# Patient Record
Sex: FEMALE | Race: BLACK OR AFRICAN AMERICAN
[De-identification: names, ages, dates, MRNs, and addresses within clinical notes are randomized per-mention and may not be internally consistent; named-entity substitution may affect disease eponyms.]

---

## 2018-10-25 PROBLEM — Z00.00 ENCOUNTER FOR PREVENTIVE HEALTH EXAMINATION: Status: ACTIVE | Noted: 2018-10-25

## 2018-10-31 ENCOUNTER — APPOINTMENT (OUTPATIENT)
Dept: OBGYN | Facility: CLINIC | Age: 68
End: 2018-10-31

## 2019-01-31 ENCOUNTER — APPOINTMENT (OUTPATIENT)
Dept: OBGYN | Facility: CLINIC | Age: 69
End: 2019-01-31

## 2023-08-28 ENCOUNTER — LABORATORY RESULT (OUTPATIENT)
Age: 73
End: 2023-08-28

## 2023-08-29 ENCOUNTER — NON-APPOINTMENT (OUTPATIENT)
Age: 73
End: 2023-08-29

## 2023-08-29 ENCOUNTER — APPOINTMENT (OUTPATIENT)
Dept: OBGYN | Facility: CLINIC | Age: 73
End: 2023-08-29
Payer: MEDICARE

## 2023-08-29 ENCOUNTER — LABORATORY RESULT (OUTPATIENT)
Age: 73
End: 2023-08-29

## 2023-08-29 DIAGNOSIS — N89.8 OTHER SPECIFIED NONINFLAMMATORY DISORDERS OF VAGINA: ICD-10-CM

## 2023-08-29 DIAGNOSIS — R39.9 UNSPECIFIED SYMPTOMS AND SIGNS INVOLVING THE GENITOURINARY SYSTEM: ICD-10-CM

## 2023-08-29 DIAGNOSIS — Z01.419 ENCOUNTER FOR GYNECOLOGICAL EXAMINATION (GENERAL) (ROUTINE) W/OUT ABNORMAL FINDINGS: ICD-10-CM

## 2023-08-29 PROCEDURE — 99203 OFFICE O/P NEW LOW 30 MIN: CPT | Mod: 25

## 2023-08-29 PROCEDURE — 99387 INIT PM E/M NEW PAT 65+ YRS: CPT | Mod: GY

## 2023-08-29 NOTE — HISTORY OF PRESENT ILLNESS
[FreeTextEntry1] : ---- 72 Y/O  here for check up;pt also c/o vulva itching and vaginal discomfort. PMHX;/RIGHT BREAST CANCER;NO CHEMO OR RT PSHX;/BREAST BX  BTL  FOOT EAR SOCIAL HX;-ETOH -CIGG  STD;   /        STD PREVENTION DISCUSSED FAMILY HISTORY OF BREAST CANCER;SISTER REVIEW OF SYMPTOMS DONE; ALLERGIES; pt answered PCN Medication reconciliation was completed by reviewing, with the patient's involvement, the patient's current outpatient medications and those  ordered for the patient today.           PE;BREASTS;- MASSES DC NODES SBE ABDOMEN;SOFT NT ND NL GENITALIA WHITE SCALY LESIONS B/L VAGINA -DC WHITE CERVIX;-CMT UTERUS;NL SIZE NT AV ADNEXA; NT - MASSES   A;P;CHECK UP, VULVITIS,VAGINAL DC -PAP GC CHLAMYDIA -BD AFFIRM -RTC FOR VULV BX -SONO -F-U AFTER ABOVE.

## 2023-08-31 LAB
APPEARANCE: CLEAR
BACTERIA UR CULT: NORMAL
BILIRUBIN URINE: NEGATIVE
BLOOD URINE: NEGATIVE
COLOR: YELLOW
GLUCOSE QUALITATIVE U: 250 MG/DL
KETONES URINE: NEGATIVE MG/DL
LEUKOCYTE ESTERASE URINE: ABNORMAL
NITRITE URINE: NEGATIVE
PH URINE: 5.5
PROTEIN URINE: NORMAL MG/DL
SPECIFIC GRAVITY URINE: 1.02
UROBILINOGEN URINE: 0.2 MG/DL

## 2023-09-01 LAB — HPV HIGH+LOW RISK DNA PNL CVX: DETECTED

## 2023-09-03 LAB
A VAGINAE DNA VAG QL NAA+PROBE: ABNORMAL
BVAB2 DNA VAG QL NAA+PROBE: NORMAL
C KRUSEI DNA VAG QL NAA+PROBE: NEGATIVE
C TRACH RRNA SPEC QL NAA+PROBE: NEGATIVE
CANDIDA DNA VAG QL NAA+PROBE: NEGATIVE
CYTOLOGY CVX/VAG DOC THIN PREP: NORMAL
MEGA1 DNA VAG QL NAA+PROBE: NORMAL
N GONORRHOEA RRNA SPEC QL NAA+PROBE: NEGATIVE
T VAGINALIS RRNA SPEC QL NAA+PROBE: NEGATIVE

## 2023-09-07 ENCOUNTER — APPOINTMENT (OUTPATIENT)
Dept: OBGYN | Facility: CLINIC | Age: 73
End: 2023-09-07
Payer: MEDICARE

## 2023-09-07 ENCOUNTER — NON-APPOINTMENT (OUTPATIENT)
Age: 73
End: 2023-09-07

## 2023-09-07 PROCEDURE — 76830 TRANSVAGINAL US NON-OB: CPT

## 2023-09-07 RX ORDER — METRONIDAZOLE 500 MG/1
500 TABLET ORAL TWICE DAILY
Qty: 14 | Refills: 0 | Status: ACTIVE | COMMUNITY
Start: 2023-09-07 | End: 1900-01-01

## 2023-09-12 ENCOUNTER — APPOINTMENT (OUTPATIENT)
Dept: OBGYN | Facility: CLINIC | Age: 73
End: 2023-09-12
Payer: MEDICARE

## 2023-09-12 DIAGNOSIS — B97.7 PAPILLOMAVIRUS AS THE CAUSE OF DISEASES CLASSIFIED ELSEWHERE: ICD-10-CM

## 2023-09-12 PROCEDURE — 99213 OFFICE O/P EST LOW 20 MIN: CPT | Mod: 25

## 2023-09-12 PROCEDURE — 57454 BX/CURETT OF CERVIX W/SCOPE: CPT

## 2023-09-12 PROCEDURE — 56605 BIOPSY OF VULVA/PERINEUM: CPT

## 2023-09-18 LAB — CORE LAB BIOPSY: NORMAL

## 2023-10-10 ENCOUNTER — APPOINTMENT (OUTPATIENT)
Dept: OBGYN | Facility: CLINIC | Age: 73
End: 2023-10-10
Payer: MEDICARE

## 2023-10-10 PROCEDURE — 99213 OFFICE O/P EST LOW 20 MIN: CPT

## 2023-10-10 RX ORDER — TERCONAZOLE 8 MG/G
0.8 CREAM VAGINAL
Qty: 20 | Refills: 1 | Status: ACTIVE | COMMUNITY
Start: 2023-10-10 | End: 1900-01-01

## 2024-04-02 ENCOUNTER — OUTPATIENT (OUTPATIENT)
Dept: OUTPATIENT SERVICES | Facility: HOSPITAL | Age: 74
LOS: 1 days | End: 2024-04-02

## 2024-04-02 ENCOUNTER — APPOINTMENT (OUTPATIENT)
Dept: SPEECH THERAPY | Facility: CLINIC | Age: 74
End: 2024-04-02

## 2024-04-02 DIAGNOSIS — R42 DIZZINESS AND GIDDINESS: ICD-10-CM

## 2024-04-02 PROCEDURE — 92540 BASIC VESTIBULAR EVALUATION: CPT

## 2024-04-02 PROCEDURE — 92537 CALORIC VSTBLR TEST W/REC: CPT

## 2024-09-25 ENCOUNTER — INPATIENT (INPATIENT)
Facility: HOSPITAL | Age: 74
LOS: 1 days | Discharge: HOME HEALTH | DRG: 312 | End: 2024-09-27
Attending: INTERNAL MEDICINE | Admitting: STUDENT IN AN ORGANIZED HEALTH CARE EDUCATION/TRAINING PROGRAM
Payer: MEDICARE

## 2024-09-25 VITALS
OXYGEN SATURATION: 97 % | RESPIRATION RATE: 20 BRPM | HEART RATE: 82 BPM | DIASTOLIC BLOOD PRESSURE: 73 MMHG | WEIGHT: 154.1 LBS | SYSTOLIC BLOOD PRESSURE: 120 MMHG | TEMPERATURE: 98 F

## 2024-09-25 DIAGNOSIS — R55 SYNCOPE AND COLLAPSE: ICD-10-CM

## 2024-09-25 LAB
ALBUMIN SERPL ELPH-MCNC: 4.4 G/DL — SIGNIFICANT CHANGE UP (ref 3.5–5.2)
ALP SERPL-CCNC: 74 U/L — SIGNIFICANT CHANGE UP (ref 30–115)
ALT FLD-CCNC: 32 U/L — SIGNIFICANT CHANGE UP (ref 0–41)
ANION GAP SERPL CALC-SCNC: 15 MMOL/L — HIGH (ref 7–14)
ANISOCYTOSIS BLD QL: SLIGHT — SIGNIFICANT CHANGE UP
AST SERPL-CCNC: 67 U/L — HIGH (ref 0–41)
BASE EXCESS BLDV CALC-SCNC: 3 MMOL/L — SIGNIFICANT CHANGE UP (ref -2–3)
BASOPHILS # BLD AUTO: 0.22 K/UL — HIGH (ref 0–0.2)
BASOPHILS NFR BLD AUTO: 2.6 % — HIGH (ref 0–1)
BILIRUB SERPL-MCNC: 0.3 MG/DL — SIGNIFICANT CHANGE UP (ref 0.2–1.2)
BUN SERPL-MCNC: 20 MG/DL — SIGNIFICANT CHANGE UP (ref 10–20)
CA-I SERPL-SCNC: 1.21 MMOL/L — SIGNIFICANT CHANGE UP (ref 1.15–1.33)
CALCIUM SERPL-MCNC: 9.8 MG/DL — SIGNIFICANT CHANGE UP (ref 8.4–10.4)
CHLORIDE SERPL-SCNC: 102 MMOL/L — SIGNIFICANT CHANGE UP (ref 98–110)
CO2 SERPL-SCNC: 23 MMOL/L — SIGNIFICANT CHANGE UP (ref 17–32)
CREAT SERPL-MCNC: 1 MG/DL — SIGNIFICANT CHANGE UP (ref 0.7–1.5)
EGFR: 59 ML/MIN/1.73M2 — LOW
EOSINOPHIL # BLD AUTO: 0 K/UL — SIGNIFICANT CHANGE UP (ref 0–0.7)
EOSINOPHIL NFR BLD AUTO: 0 % — SIGNIFICANT CHANGE UP (ref 0–8)
GAS PNL BLDV: 132 MMOL/L — LOW (ref 136–145)
GAS PNL BLDV: SIGNIFICANT CHANGE UP
GAS PNL BLDV: SIGNIFICANT CHANGE UP
GIANT PLATELETS BLD QL SMEAR: PRESENT — SIGNIFICANT CHANGE UP
GLUCOSE BLDC GLUCOMTR-MCNC: 140 MG/DL — HIGH (ref 70–99)
GLUCOSE SERPL-MCNC: 155 MG/DL — HIGH (ref 70–99)
HCO3 BLDV-SCNC: 29 MMOL/L — SIGNIFICANT CHANGE UP (ref 22–29)
HCT VFR BLD CALC: 39.5 % — SIGNIFICANT CHANGE UP (ref 37–47)
HCT VFR BLDA CALC: 51 % — HIGH (ref 34.5–46.5)
HGB BLD CALC-MCNC: 16.9 G/DL — HIGH (ref 11.7–16.1)
HGB BLD-MCNC: 12.9 G/DL — SIGNIFICANT CHANGE UP (ref 12–16)
LACTATE BLDV-MCNC: 2.7 MMOL/L — HIGH (ref 0.5–2)
LYMPHOCYTES # BLD AUTO: 2.02 K/UL — SIGNIFICANT CHANGE UP (ref 1.2–3.4)
LYMPHOCYTES # BLD AUTO: 24.3 % — SIGNIFICANT CHANGE UP (ref 20.5–51.1)
MANUAL SMEAR VERIFICATION: SIGNIFICANT CHANGE UP
MCHC RBC-ENTMCNC: 29.1 PG — SIGNIFICANT CHANGE UP (ref 27–31)
MCHC RBC-ENTMCNC: 32.7 G/DL — SIGNIFICANT CHANGE UP (ref 32–37)
MCV RBC AUTO: 89 FL — SIGNIFICANT CHANGE UP (ref 81–99)
MICROCYTES BLD QL: SLIGHT — SIGNIFICANT CHANGE UP
MONOCYTES # BLD AUTO: 0.72 K/UL — HIGH (ref 0.1–0.6)
MONOCYTES NFR BLD AUTO: 8.7 % — SIGNIFICANT CHANGE UP (ref 1.7–9.3)
NEUTROPHILS # BLD AUTO: 5.28 K/UL — SIGNIFICANT CHANGE UP (ref 1.4–6.5)
NEUTROPHILS NFR BLD AUTO: 63.5 % — SIGNIFICANT CHANGE UP (ref 42.2–75.2)
NT-PROBNP SERPL-SCNC: 56 PG/ML — SIGNIFICANT CHANGE UP (ref 0–300)
PCO2 BLDV: 48 MMHG — HIGH (ref 39–42)
PH BLDV: 7.39 — SIGNIFICANT CHANGE UP (ref 7.32–7.43)
PLAT MORPH BLD: ABNORMAL
PLATELET # BLD AUTO: 174 K/UL — SIGNIFICANT CHANGE UP (ref 130–400)
PMV BLD: 12.7 FL — HIGH (ref 7.4–10.4)
PO2 BLDV: 34 MMHG — SIGNIFICANT CHANGE UP (ref 25–45)
POLYCHROMASIA BLD QL SMEAR: SIGNIFICANT CHANGE UP
POTASSIUM BLDV-SCNC: 4.8 MMOL/L — SIGNIFICANT CHANGE UP (ref 3.5–5.1)
POTASSIUM SERPL-MCNC: 5.4 MMOL/L — HIGH (ref 3.5–5)
POTASSIUM SERPL-SCNC: 5.4 MMOL/L — HIGH (ref 3.5–5)
PROT SERPL-MCNC: 7.4 G/DL — SIGNIFICANT CHANGE UP (ref 6–8)
RBC # BLD: 4.44 M/UL — SIGNIFICANT CHANGE UP (ref 4.2–5.4)
RBC # FLD: 15.3 % — HIGH (ref 11.5–14.5)
RBC BLD AUTO: ABNORMAL
SAO2 % BLDV: 56.9 % — LOW (ref 67–88)
SODIUM SERPL-SCNC: 140 MMOL/L — SIGNIFICANT CHANGE UP (ref 135–146)
TROPONIN T, HIGH SENSITIVITY RESULT: 9 NG/L — SIGNIFICANT CHANGE UP (ref 6–13)
VARIANT LYMPHS # BLD: 0.9 % — SIGNIFICANT CHANGE UP (ref 0–5)
WBC # BLD: 8.31 K/UL — SIGNIFICANT CHANGE UP (ref 4.8–10.8)
WBC # FLD AUTO: 8.31 K/UL — SIGNIFICANT CHANGE UP (ref 4.8–10.8)

## 2024-09-25 PROCEDURE — 99222 1ST HOSP IP/OBS MODERATE 55: CPT | Mod: GC

## 2024-09-25 PROCEDURE — 71275 CT ANGIOGRAPHY CHEST: CPT | Mod: 26,MC

## 2024-09-25 PROCEDURE — 82652 VIT D 1 25-DIHYDROXY: CPT

## 2024-09-25 PROCEDURE — 71045 X-RAY EXAM CHEST 1 VIEW: CPT | Mod: 26

## 2024-09-25 PROCEDURE — 99285 EMERGENCY DEPT VISIT HI MDM: CPT

## 2024-09-25 PROCEDURE — 36415 COLL VENOUS BLD VENIPUNCTURE: CPT

## 2024-09-25 PROCEDURE — 82962 GLUCOSE BLOOD TEST: CPT

## 2024-09-25 PROCEDURE — 80053 COMPREHEN METABOLIC PANEL: CPT

## 2024-09-25 RX ORDER — METOPROLOL TARTRATE 50 MG
50 TABLET ORAL DAILY
Refills: 0 | Status: DISCONTINUED | OUTPATIENT
Start: 2024-09-25 | End: 2024-09-27

## 2024-09-25 RX ORDER — GLIPIZIDE 5 MG/1
1 TABLET ORAL
Refills: 0 | DISCHARGE

## 2024-09-25 RX ORDER — ROSUVASTATIN CALCIUM 20 MG/1
1 TABLET, COATED ORAL
Refills: 0 | DISCHARGE

## 2024-09-25 RX ORDER — PANTOPRAZOLE SODIUM 40 MG/1
40 TABLET, DELAYED RELEASE ORAL
Refills: 0 | Status: DISCONTINUED | OUTPATIENT
Start: 2024-09-25 | End: 2024-09-27

## 2024-09-25 RX ORDER — ENOXAPARIN SODIUM 150 MG/ML
40 INJECTION SUBCUTANEOUS EVERY 24 HOURS
Refills: 0 | Status: DISCONTINUED | OUTPATIENT
Start: 2024-09-25 | End: 2024-09-27

## 2024-09-25 RX ORDER — METOPROLOL TARTRATE 50 MG
1 TABLET ORAL
Refills: 0 | DISCHARGE

## 2024-09-25 RX ORDER — SEMAGLUTIDE 1.34 MG/ML
1 INJECTION, SOLUTION SUBCUTANEOUS
Refills: 0 | DISCHARGE

## 2024-09-25 RX ORDER — METFORMIN HCL 500 MG
1 TABLET ORAL
Refills: 0 | DISCHARGE

## 2024-09-25 RX ORDER — ASPIRIN 325 MG
0 TABLET ORAL
Refills: 0 | DISCHARGE

## 2024-09-25 RX ORDER — ROSUVASTATIN CALCIUM 20 MG/1
20 TABLET, COATED ORAL AT BEDTIME
Refills: 0 | Status: DISCONTINUED | OUTPATIENT
Start: 2024-09-25 | End: 2024-09-27

## 2024-09-25 RX ORDER — ASPIRIN 325 MG
81 TABLET ORAL DAILY
Refills: 0 | Status: DISCONTINUED | OUTPATIENT
Start: 2024-09-25 | End: 2024-09-27

## 2024-09-25 RX ORDER — LISINOPRIL/HYDROCHLOROTHIAZIDE 10-12.5 MG
1 TABLET ORAL
Refills: 0 | DISCHARGE

## 2024-09-25 RX ORDER — CRANBERRY FRUIT EXTRACT 650 MG
1000 CAPSULE ORAL DAILY
Refills: 0 | Status: DISCONTINUED | OUTPATIENT
Start: 2024-09-25 | End: 2024-09-27

## 2024-09-25 NOTE — H&P ADULT - HISTORY OF PRESENT ILLNESS
74 hx HTN, HLD, DM2, GERD, p/w SOB and was referred by her PMD to r/o PE.  She explains intermittent SOB episodes for last 6 weeks, episodes lasting few minutes for last 6 weeks, resolves on its own, happens both at rest and ambulation; No associated chest pain, palpitations, SOB; No orthopnea, PND.     She also endorses episodes where she episodes where she is "falling asleep"     ED Course  Vitals: HDS and Afebrile  Imaging: No PE  Labs: Unremarkable     Patient being admitted to medicine for further management.      74 hx HTN, HLD, DM2, GERD, p/w SOB and was referred by her PMD to r/o PE.  She explains intermittent SOB episodes for last 6 weeks, episodes lasting few minutes for last 6 weeks, resolves on its own, happens both at rest and ambulation; No associated chest pain, palpitations, SOB; No orthopnea, PND.     ED Course  Vitals: HDS and Afebrile  Imaging: No PE  Labs: Unremarkable     Patient being admitted to medicine for further management.

## 2024-09-25 NOTE — ED PROVIDER NOTE - ATTENDING APP SHARED VISIT CONTRIBUTION OF CARE
74 hx htn, hld, dm, gerd  pt here for eval of 6 weeks of intermittent sob. pt saw OP cards today who rec ed eval to r/o pe.  no cp, [place].  no pattern to sob episodes. sx last ~5 minutes and pass. no LE edema.  no palpitations or syncope. no orthopniea     vss  gen- NAD, aaox3  card-rrr  lungs-ctab, no wheezing or rhonchi  abd-sntnd, no guarding or rebound  neuro- full str/sensation, cn ii-xii grossly intact, normal coordination  LE- no calf pain/swelling/tenderness b/l 74 hx htn, hld, dm, gerd  pt here for eval of 6 weeks of intermittent sob. pt saw OP cards today who rec ed eval to r/o pe.  no cp, [place].  no pattern to sob episodes. sx last ~5 minutes and pass. no LE edema.  no palpitations . pt states with some of these episodes she "falls asleep." pt states she has sat down for the SOB event and "woke up." no incontinence, tongue bite, shaking, hx sz, shaking.       vss  gen- NAD, aaox3  card-rrr  lungs-ctab, no wheezing or rhonchi  abd-sntnd, no guarding or rebound  neuro- full str/sensation, cn ii-xii grossly intact, normal coordination  LE- no calf pain/swelling/tenderness b/l

## 2024-09-25 NOTE — ED PROVIDER NOTE - CLINICAL SUMMARY MEDICAL DECISION MAKING FREE TEXT BOX
Patient endorsed to me by Dr. Guzman pending CT angio and admission.   74 hx htn, hld, dm, gerd p/w SOB and episodes where she states she is "falling asleep" however unsure if these episodes are syncopal. denies chest pain. ekg independently interpreted by me Dr. Madrigal showing NSR, no stemi. labs stable. CT angio chest with no PE, "Diffuse bilateral mosaic ground-glass pattern, which may be secondary to air trapping." ambulatory pulse ox within normal limits. however given recent hx of ablation?? in the city last year, concern for possible arrhythmia. will admit for further work up.

## 2024-09-25 NOTE — ED PROVIDER NOTE - PHYSICAL EXAMINATION
Gen: NAD, AOx3  Head: NCAT  HEENT: PERRL, oral mucosa moist, normal conjunctiva, oropharynx clear without exudate or erythema  Lung: CTAB, no respiratory distress, no wheezing, rales, rhonchi  CV: normal s1/s2, rrr, Normal perfusion, pulses 2+ throughout  Abd: soft, NTND, no CVA tenderness  Genitourinary: no pelvic tenderness  MSK: No edema, no visible deformities, full range of motion in all 4 extremities  Neuro: CN II-XII grossly intact, No focal neurologic deficits, no meningeal signs, no nystagmus/pronator drift, coordination/sensation intact, strength 5/5 BUE/BLE, steady gait   Skin: No rash   Psych: normal affect WDL

## 2024-09-25 NOTE — H&P ADULT - NSHPLABSRESULTS_GEN_ALL_CORE
Home Medications:  aspirin 81 mg oral delayed release capsule: orally once a day (25 Sep 2024 21:40)  glipiZIDE 5 mg oral tablet: 1 tab(s) orally once a day (25 Sep 2024 21:40)  lisinopril-hydrochlorothiazide 20 mg-25 mg oral tablet: 1 tab(s) orally once a day (25 Sep 2024 21:40)  metFORMIN 1000 mg oral tablet: 1 tab(s) orally 2 times a day (25 Sep 2024 21:40)  metoprolol succinate 50 mg oral capsule, extended release: 1 cap(s) orally once a day (25 Sep 2024 21:40)  omeprazole 40 mg oral delayed release capsule: 1 cap(s) orally once a day (25 Sep 2024 21:40)  Ozempic 4 mg/3 mL (1 mg dose) subcutaneous solution: 1 milligram(s) subcutaneously once a week (25 Sep 2024 21:40)  rosuvastatin 20 mg oral tablet: 1 tab(s) orally once a day (at bedtime) (25 Sep 2024 21:40)    MEDICATIONS  (STANDING):    MEDICATIONS  (PRN):        LABS:                        12.9   8.31  )-----------( 174      ( 25 Sep 2024 13:07 )             39.5     09-25    140  |  102  |  20  ----------------------------<  155[H]  5.4[H]   |  23  |  1.0    Ca    9.8      25 Sep 2024 13:07    TPro  7.4  /  Alb  4.4  /  TBili  0.3  /  DBili  x   /  AST  67[H]  /  ALT  32  /  AlkPhos  74  09-25    LIVER FUNCTIONS - ( 25 Sep 2024 13:07 )  Alb: 4.4 g/dL / Pro: 7.4 g/dL / ALK PHOS: 74 U/L / ALT: 32 U/L / AST: 67 U/L / GGT: x                 Urinalysis Basic - ( 25 Sep 2024 13:07 )    Color: x / Appearance: x / SG: x / pH: x  Gluc: 155 mg/dL / Ketone: x  / Bili: x / Urobili: x   Blood: x / Protein: x / Nitrite: x   Leuk Esterase: x / RBC: x / WBC x   Sq Epi: x / Non Sq Epi: x / Bacteria: x

## 2024-09-25 NOTE — ED PROVIDER NOTE - OBJECTIVE STATEMENT
74-year-old female with past medical history of HTN, HLD, DM, GERD presents complaining of shortness of breath over the past 6 weeks.  Patient denies any aggravating/alleviating factors and notes to have SOB episodes that last about 5mins.  Patient mentions to have possibly syncopized. pt denies any other symptoms including fevers, chill, headache, recent illness/travel, cough, abdominal pain, chest pain, or SOB.

## 2024-09-25 NOTE — H&P ADULT - ASSESSMENT
azithromycin (ZITHROMAX Z-SMITA) 250 mg tablet Take 2 tablets the first day then  1 tablet each day till completion   dapagliflozin propanediol (FARXIGA) 10 mg tablet Take 1 tablet by mouth once  daily.   metoprolol succinate XL (TOPROL XL) 50 mg tablet extended release 24 hr TAKE 1  TABLET BY MOUTH EVERY DAY   diclofenac (VOLTAREN) 1 % topical gel Apply to effected area daily as directed   semaglutide (OZEMPIC) 1 mg/dose (4 mg/3 mL) syringe Inject 1 mg subcutaneously  once a week.   aspirin 81 mg tablet,delayed release (DR/EC) Take 1 tablet by mouth once daily.   glipiZIDE (GLUCOTROL) 5 mg tablet Take 1 tablet by mouth every morning before  breakfast.   lisinopriL-hydrochlorothiazide (ZESTORETIC) 20-25 mg tablet TAKE 1 TABLET BY  MOUTH EVERY DAY   metFORMIN (GLUCOPHAGE) 1,000 mg tablet Take 1 tablet by mouth in the morning  and 1 tablet in the evening. Take with meals.   omeprazole (PRILOSEC) 40 mg capsule TAKE 1 CAPSULE BY MOUTH EVERY DAY   rosuvastatin (CRESTOR) 20 mg tablet Take 1 tablet by mouth once daily.   vitamin D2 (ERGOCALCIFEROL) 50,000 unit capsule TAKE 1 CAPSULE (50,000 UNITS)  BY MOUTH WEEKLY FOR 12 WEEKS    1. Poorly controlled diabetes mellitus  - dapagliflozin propanediol (FARXIGA) 10 mg tablet; Take 1 tablet by mouth once  daily. Dispense: 90 tablet; Refill: 3    2. Screening mammogram for breast cancer  - MAMMO BREAST SCREENING BILATERAL WITH ULTRASOUND OR MRI IF NEEDED; Future    3. Type 2 diabetes mellitus with right eye affected by mild nonproliferative  retinopathy without macular edema, without long-term current use of insulin  - AMB REF TO OPHTHALMOLOGY    4. Pharyngitis due to other organism  - azithromycin (ZITHROMAX Z-SMITA) 250 mg tablet; Take 2 tablets the first day  then 1 tablet each day till completion Dispense: 6 each; Refill: 0    5. Weight loss, non-intentional  Comments: unclear if due to ozempic, oral intake or anothr process    EKG: Sinus Rhythm with occasional PVCs     Zolpidem Tartrate (AMBIEN) 10 mg tablet TAKE 1 TABLET BY MOUTH EVERY NIGHT AT  BEDTIME AS NEEDED FOR SLEEP   meclizine (ANTIVERT) 25 mg tablet tablet Take 1 tablet by mouth 3 times daily  as needed.    K+ 5.4 hemolysed  AST 67  eFGR 59  Lactate 2.7     CT PE  No evidence of pulmonary embolism.  Diffuse bilateral mosaic ground-glass pattern, which may be secondary to air trapping.    HST 9 # Intermittent SOB episodes  - Episodes of SOB lasting few minutes for last 6 weeks, resolves on its own, happens both at rest and ambulation  - No associated chest pain, palpitations, SOB  - No orthopnea, PND  - Explains episodes as gasping for breath, air hunger possibly Asthma  - Saturating 96 % on RA  - EKG: Sinus Rhythm with occasional PVCs  - HST 9  - CT PE  No evidence of pulmonary embolism.  Diffuse bilateral mosaic ground-glass pattern, which may be secondary to air trapping.  - Pulm O/P for PFT    # Episodes of Sleep Attacks  - Endorses chronic daytime sleepiness with sleep attacks  - No cataplexy, sleep paralysis or hypnogogic/hypnopompic hallucinations   - STOP-BANG Score: 4 High Risk for CHRISTO  - Pulm O/P for sleep study    # H/O A Fib/Flutter  - Reports 2 cardioversion and ablation procedure done  - No palpitations   - EKG: NSR  - c/w home Metoprolol succinate 50 mg daily  - c/w home Aspirin 81 mg daily    # Type 2 diabetes mellitus  # Right eye affected by mild nonproliferative retinopathy without macular edema  - Home Meds: Semaglutide qweekly, Metformin 100 mg BID, Glipizide 5 mg daily  - Insulin protocol     # GERD  - c/w Omperazole 40 mg daily    # Hyperlipidemia  - c/w Crestor 20 mg daily    # Vit D deficiency  - Completed VitD 50K course  - f/u Vit D levels  - c/w Vit D3 1000 U daily    # Hypertension  - c/w home meds: Lisinopril 20 and HCTZ 25 mg     Miscellaneous:  DVT prophylaxis: Lovenox  Bowel  - Feeds: DASH/TLC  - Prophylaxis: Omeprazole  - Regimen: None  Activity: AAT  MedRec: Confirmed with patient at bedside, [address]  Code status: Full                      # Intermittent SOB episodes  - Episodes of SOB lasting few minutes for last 6 weeks, resolves on its own, happens both at rest and ambulation  - No associated chest pain, palpitations, SOB  - No orthopnea, PND  - Explains episodes as gasping for breath, air hunger possibly Asthma  - Saturating 96 % on RA  - EKG: Sinus Rhythm with occasional PVCs  - HST 9  - CT PE  No evidence of pulmonary embolism.  Diffuse bilateral mosaic ground-glass pattern, which may be secondary to air trapping.  - Pulm O/P for PFT    # Episodes of Sleep Attacks  - Endorses chronic daytime sleepiness with sleep attacks  - No cataplexy, sleep paralysis or hypnogogic/hypnopompic hallucinations   - STOP-BANG Score: 4 High Risk for CHRISTO  - Pulm O/P for sleep study    # H/O A Fib/Flutter  - Reports 2 cardioversion and ablation procedure done  - No palpitations   - EKG: NSR  - c/w home Metoprolol succinate 50 mg daily  - c/w home Aspirin 81 mg daily    # Type 2 diabetes mellitus  # Right eye affected by mild nonproliferative retinopathy without macular edema  - Home Meds: Semaglutide qweekly, Metformin 100 mg BID, Glipizide 5 mg daily  - Insulin protocol     # GERD  - c/w Omperazole 40 mg daily    # Hyperlipidemia  - c/w Crestor 20 mg daily    # Vit D deficiency  - Completed VitD 50K course  - f/u Vit D levels  - c/w Vit D3 1000 U daily    # Hypertension  - c/w home meds: Lisinopril 20 and HCTZ 25 mg     Miscellaneous:  DVT prophylaxis: Lovenox  Bowel  - Feeds: DASH/TLC  - Prophylaxis: Omeprazole  - Regimen: None  Activity: AAT  MedRec: Confirmed with patient at bedside, [address]  Code status: Full   Handoff: Discharge in AM                     74 hx HTN, HLD, DM2, GERD, p/w SOB and was referred by her PMD to r/o PE.  She explains intermittent SOB episodes for last 6 weeks, episodes lasting few minutes for last 6 weeks, resolves on its own, happens both at rest and ambulation; No associated chest pain, palpitations, SOB; No orthopnea, PND.       1.  Intermittent SOB episodes  *  Episodes of SOB lasting few minutes for last 6 weeks, resolves on its own, happens both at rest and ambulation  * No associated chest pain, palpitations, SOB  * No orthopnea, PND  - Admit to medicine              - ECG:    Sinus Rhythm with occasional PVCs              - CT PE:  a) No evidence of pulmonary embolism.  b) Diffuse bilateral mosaic ground-glass pattern, which may be secondary to air trapping.  - Pulm O/P for PFT    2.  Episodes of Sleep Attacks  - Endorses chronic daytime sleepiness with sleep attacks  - No cataplexy, sleep paralysis or hypnogogic/hypnopompic hallucinations   - STOP-BANG Score: 4 High Risk for CHRISTO  - Pulm O/P for sleep study    3. H/O A Fib/Flutter  - Reports 2 cardioversion and ablation procedure done  - No palpitations   - EKG: NSR  - c/w home Metoprolol succinate 50 mg daily  - c/w home Aspirin 81 mg daily    4. Type 2 diabetes mellitus with  Right eye affected by mild nonproliferative retinopathy without macular edema  - Home Meds: Semaglutide qweekly, Metformin 100 mg BID, Glipizide 5 mg daily  - Insulin protocol     5. GERD  - c/w Omperazole 40 mg daily    6.  Hyperlipidemia  - c/w Crestor 20 mg daily    7. Vit D deficiency  - Completed VitD 50K course  - f/u Vit D levels  - c/w Vit D3 1000 U daily    8.  Hypertension  - c/w home meds: Lisinopril 20 and HCTZ 25 mg     Miscellaneous:  DVT prophylaxis: Lovenox  Bowel  - Feeds: DASH/TLC  - Prophylaxis: Omeprazole  - Regimen: None  Activity: [place]  MedRec: Confirmed with patient at bedside, [address]  Code status: Full   Handoff: Discharge in AM                     74 hx HTN, HLD, DM2, GERD, p/w SOB and was referred by her PMD to r/o PE.  She explains intermittent SOB episodes for last 6 weeks, episodes lasting few minutes for last 6 weeks, resolves on its own, happens both at rest and ambulation; No associated chest pain, palpitations, SOB; No orthopnea, PND.       1.  Intermittent SOB episodes  *  Episodes of SOB lasting few minutes for last 6 weeks, resolves on its own, happens both at rest and ambulation  * No associated chest pain, palpitations, SOB  * No orthopnea, PND  - Admit to medicine              - ECG:    Sinus Rhythm with occasional PVCs          - CT PE:  a) No evidence of pulmonary embolism.  b) Diffuse bilateral mosaic ground-glass pattern, which may be secondary to air trapping.  - Pulm O/P for PFT    2.  Episodes of Sleep Attacks  - Endorses chronic daytime sleepiness with sleep attacks  - No cataplexy, sleep paralysis or hypnogogic/hypnopompic hallucinations   - STOP-BANG Score: 4 High Risk for CHRISTO  - Pulm O/P for sleep study    3. H/O A Fib/Flutter  - Reports 2 cardioversion and ablation procedure done  - No palpitations   - EKG: NSR  - c/w home Metoprolol succinate 50 mg daily  - c/w home Aspirin 81 mg daily    4. Type 2 diabetes mellitus with  Right eye affected by mild nonproliferative retinopathy without macular edema  - Home Meds: Semaglutide qweekly, Metformin 100 mg BID, Glipizide 5 mg daily  - Insulin protocol     5. GERD  - c/w Omperazole 40 mg daily    6.  Hyperlipidemia  - c/w Crestor 20 mg daily    7. Vit D deficiency  - Completed VitD 50K course  - f/u Vit D levels  - c/w Vit D3 1000 U daily    8.  Hypertension  - c/w home meds: Lisinopril 20 and HCTZ 25 mg     Miscellaneous:  DVT prophylaxis: Lovenox  Bowel  - Feeds: DASH/TLC  - Prophylaxis: Omeprazole  - Regimen: None  Activity: [place]  MedRec: Confirmed with patient at bedside, [address]  Code status: Full   Handoff: Discharge in AM

## 2024-09-25 NOTE — H&P ADULT - ATTENDING COMMENTS
# Intermittent SOB episodes  - Episodes of SOB lasting few minutes for last 6 weeks, resolves on its own, happens both at rest and ambulation  - No associated chest pain, palpitations, SOB  - No orthopnea, PND  - Explains episodes as gasping for breath, air hunger possibly Asthma  - Saturating 96 % on RA  - EKG: Sinus Rhythm with occasional PVCs  - HST 9  - CT PE  No evidence of pulmonary embolism.  Diffuse bilateral mosaic ground-glass pattern, which may be secondary to air trapping.  - Pulm O/P for PFT    # Episodes of Sleep Attacks  - Endorses chronic daytime sleepiness with sleep attacks  - No cataplexy, sleep paralysis or hypnogogic/hypnopompic hallucinations   - STOP-BANG Score: 4 High Risk for CHRISTO  - Pulm O/P for sleep study    # H/O A Fib/Flutter  - Reports 2 cardioversion and ablation procedure done  - No palpitations   - EKG: NSR  - c/w home Metoprolol succinate 50 mg daily  - c/w home Aspirin 81 mg daily    # Type 2 diabetes mellitus  # Right eye affected by mild nonproliferative retinopathy without macular edema  - Home Meds: Semaglutide qweekly, Metformin 100 mg BID, Glipizide 5 mg daily  - Insulin protocol     # GERD  - c/w Omperazole 40 mg daily    # Hyperlipidemia  - c/w Crestor 20 mg daily    # Vit D deficiency  - Completed VitD 50K course  - f/u Vit D levels  - c/w Vit D3 1000 U daily    # Hypertension  - c/w home meds: Lisinopril 20 and HCTZ 25 mg     Miscellaneous:  DVT prophylaxis: Lovenox  Bowel  - Feeds: DASH/TLC  - Prophylaxis: Omeprazole  - Regimen: None  Activity: AAT  MedRec: Confirmed with patient at bedside, [address]  Code status: Full   Handoff: Discharge in AM

## 2024-09-25 NOTE — H&P ADULT - NSHPPHYSICALEXAM_GEN_ALL_CORE
Physical Examination   CONSTITUTIONAL: Not in acute distress  NEUROLOGICAL: Alert and oriented x 3  EYES: Pupils equal, Round and reactive to light.  CARDIAC: Normal rate, Regular rhythm.    RESPIRATORY: No wheezing, No crackles, Normal chest expansion, Not tachypneic, No use of accessory muscles  GASTROINTESTINAL: Abdomen soft, Non-tender, No guarding, + BS  EXTREMITIES:  2+ Peripheral Pulses, No clubbing, cyanosis, or edema

## 2024-09-26 ENCOUNTER — TRANSCRIPTION ENCOUNTER (OUTPATIENT)
Age: 74
End: 2024-09-26

## 2024-09-26 VITALS
RESPIRATION RATE: 18 BRPM | TEMPERATURE: 98 F | DIASTOLIC BLOOD PRESSURE: 74 MMHG | SYSTOLIC BLOOD PRESSURE: 112 MMHG | OXYGEN SATURATION: 98 % | HEART RATE: 81 BPM

## 2024-09-26 LAB
ALBUMIN SERPL ELPH-MCNC: 4.3 G/DL — SIGNIFICANT CHANGE UP (ref 3.5–5.2)
ALP SERPL-CCNC: 88 U/L — SIGNIFICANT CHANGE UP (ref 30–115)
ALT FLD-CCNC: 29 U/L — SIGNIFICANT CHANGE UP (ref 0–41)
ANION GAP SERPL CALC-SCNC: 15 MMOL/L — HIGH (ref 7–14)
AST SERPL-CCNC: 29 U/L — SIGNIFICANT CHANGE UP (ref 0–41)
BILIRUB SERPL-MCNC: 0.2 MG/DL — SIGNIFICANT CHANGE UP (ref 0.2–1.2)
BUN SERPL-MCNC: 16 MG/DL — SIGNIFICANT CHANGE UP (ref 10–20)
CALCIUM SERPL-MCNC: 9.2 MG/DL — SIGNIFICANT CHANGE UP (ref 8.4–10.5)
CHLORIDE SERPL-SCNC: 104 MMOL/L — SIGNIFICANT CHANGE UP (ref 98–110)
CO2 SERPL-SCNC: 23 MMOL/L — SIGNIFICANT CHANGE UP (ref 17–32)
CREAT SERPL-MCNC: 0.8 MG/DL — SIGNIFICANT CHANGE UP (ref 0.7–1.5)
EGFR: 78 ML/MIN/1.73M2 — SIGNIFICANT CHANGE UP
GLUCOSE BLDC GLUCOMTR-MCNC: 157 MG/DL — HIGH (ref 70–99)
GLUCOSE BLDC GLUCOMTR-MCNC: 171 MG/DL — HIGH (ref 70–99)
GLUCOSE SERPL-MCNC: 150 MG/DL — HIGH (ref 70–99)
POTASSIUM SERPL-MCNC: 4 MMOL/L — SIGNIFICANT CHANGE UP (ref 3.5–5)
POTASSIUM SERPL-SCNC: 4 MMOL/L — SIGNIFICANT CHANGE UP (ref 3.5–5)
PROT SERPL-MCNC: 6.7 G/DL — SIGNIFICANT CHANGE UP (ref 6–8)
SODIUM SERPL-SCNC: 142 MMOL/L — SIGNIFICANT CHANGE UP (ref 135–146)
VIT D25+D1,25 OH+D1,25 PNL SERPL-MCNC: 55.9 PG/ML — SIGNIFICANT CHANGE UP (ref 19.9–79.3)

## 2024-09-26 PROCEDURE — 99235 HOSP IP/OBS SAME DATE MOD 70: CPT

## 2024-09-26 RX ORDER — ALCOHOL ANTISEPTIC PADS
15 PADS, MEDICATED (EA) TOPICAL ONCE
Refills: 0 | Status: DISCONTINUED | OUTPATIENT
Start: 2024-09-26 | End: 2024-09-27

## 2024-09-26 RX ORDER — SODIUM CHLORIDE IRRIG SOLUTION 0.9 %
1000 SOLUTION, IRRIGATION IRRIGATION
Refills: 0 | Status: DISCONTINUED | OUTPATIENT
Start: 2024-09-26 | End: 2024-09-27

## 2024-09-26 RX ORDER — INSULIN LISPRO 100/ML
VIAL (ML) SUBCUTANEOUS
Refills: 0 | Status: DISCONTINUED | OUTPATIENT
Start: 2024-09-26 | End: 2024-09-27

## 2024-09-26 RX ORDER — ALCOHOL ANTISEPTIC PADS
25 PADS, MEDICATED (EA) TOPICAL ONCE
Refills: 0 | Status: DISCONTINUED | OUTPATIENT
Start: 2024-09-26 | End: 2024-09-27

## 2024-09-26 RX ORDER — BUDESONIDE AND FORMOTEROL FUMARATE DIHYDRATE 80; 4.5 UG/1; UG/1
2 AEROSOL RESPIRATORY (INHALATION)
Qty: 1 | Refills: 0
Start: 2024-09-26

## 2024-09-26 RX ORDER — GLUCAGON INJECTION, SOLUTION 0.5 MG/.1ML
1 INJECTION, SOLUTION SUBCUTANEOUS ONCE
Refills: 0 | Status: DISCONTINUED | OUTPATIENT
Start: 2024-09-26 | End: 2024-09-27

## 2024-09-26 RX ORDER — ALBUTEROL 90 MCG
1 AEROSOL (GRAM) INHALATION
Qty: 1 | Refills: 0
Start: 2024-09-26

## 2024-09-26 RX ORDER — ALCOHOL ANTISEPTIC PADS
12.5 PADS, MEDICATED (EA) TOPICAL ONCE
Refills: 0 | Status: DISCONTINUED | OUTPATIENT
Start: 2024-09-26 | End: 2024-09-27

## 2024-09-26 RX ADMIN — PANTOPRAZOLE SODIUM 40 MILLIGRAM(S): 40 TABLET, DELAYED RELEASE ORAL at 08:01

## 2024-09-26 RX ADMIN — Medication 20 MILLIGRAM(S): at 06:03

## 2024-09-26 RX ADMIN — Medication 50 MILLIGRAM(S): at 06:03

## 2024-09-26 RX ADMIN — Medication 81 MILLIGRAM(S): at 12:08

## 2024-09-26 NOTE — DISCHARGE NOTE NURSING/CASE MANAGEMENT/SOCIAL WORK - PATIENT PORTAL LINK FT
You can access the FollowMyHealth Patient Portal offered by [facility] by registering at the following website: [email or web address]. By joining [facility]’s FollowMyHealth portal, you will also be able to view your health information using other applications (apps) compatible with our system.

## 2024-09-26 NOTE — DISCHARGE NOTE PROVIDER - NSDCFUSCHEDAPPT_GEN_ALL_CORE_FT
Fabby Gutiérrez  [facility]  [facility] [address]  Scheduled Appointment: 10/28/2024    
complains of pain/discomfort

## 2024-09-26 NOTE — DISCHARGE NOTE PROVIDER - HOSPITAL COURSE
74 hx HTN, HLD, DM2, GERD, p/w SOB and was referred by her PMD to r/o PE.  She explains intermittent SOB episodes for last 6 weeks, episodes lasting few minutes for last 6 weeks, resolves on its own, happens both at rest and ambulation; No associated chest pain, palpitations, SOB; No orthopnea, PND.     ED Course  Vitals: HDS and Afebrile  Imaging: No PE  Labs: Unremarkable     Patient being admitted to medicine for further management.     # Intermittent SOB episodes  - Episodes of SOB lasting few minutes for last 6 weeks, resolves on its own, happens both at rest and ambulation  - No associated chest pain, palpitations, SOB  - No orthopnea, PND  - Explains episodes as gasping for breath, air hunger possibly Asthma  - Saturating 96 % on RA  - EKG: Sinus Rhythm with occasional PVCs  - HST 9  - CT PE  No evidence of pulmonary embolism.  Diffuse bilateral mosaic ground-glass pattern, which may be secondary to air trapping.  - Pulm O/P for PFT    # Episodes of Sleep Attacks  - Endorses chronic daytime sleepiness with sleep attacks  - No cataplexy, sleep paralysis or hypnogogic/hypnopompic hallucinations   - STOP-BANG Score: 4 High Risk for CHRISTO  - Pulm O/P for sleep study    # H/O A Fib/Flutter  - Reports 2 cardioversion and ablation procedure done  - No palpitations   - EKG: NSR  - c/w home Metoprolol succinate 50 mg daily  - c/w home Aspirin 81 mg daily    # Type 2 diabetes mellitus  # Right eye affected by mild nonproliferative retinopathy without macular edema  - Home Meds: Semaglutide qweekly, Metformin 100 mg BID, Glipizide 5 mg daily  - Insulin protocol     # GERD  - c/w Omperazole 40 mg daily    # Hyperlipidemia  - c/w Crestor 20 mg daily    # Vit D deficiency  - Completed VitD 50K course  - f/u Vit D levels    # Hypertension  - c/w home meds: Lisinopril 20 and HCTZ 25 mg     Miscellaneous:  DVT prophylaxis: Lovenox  Bowel  - Feeds: DASH/TLC  - Prophylaxis: Omeprazole  - Regimen: None  Activity: AAT  MedRec: Confirmed with patient at bedside, [address]  Code status: Full     Patient is now medically stable for discharge. Patient will be sent home with two inhaler prescriptions: Proair and symbicort. Patient also instructed to follow up with an outpatient pulmonary doctor for PFTs. Patient endorsed already having set up an appointment to do this. 74 hx HTN, HLD, DM2, GERD, p/w SOB and was referred by her PMD to r/o PE.  She explains intermittent SOB episodes for last 6 weeks, episodes lasting few minutes for last 6 weeks, resolves on its own, happens both at rest and ambulation; No associated chest pain, palpitations, SOB; No orthopnea, PND.       1.  Intermittent SOB episodes might be due to possible chronic bronchitis (no hx of smoking, signs of abn lungs on ct chest)   *  Episodes of SOB lasting few minutes for last 6 weeks, resolves on its own, happens both at rest and ambulation  * No associated chest pain, palpitations, SOB  * No orthopnea, PND  - Admit to medicine              - ECG:    Sinus Rhythm with occasional PVCs          - DC on inhalers with follow up with Pulmonary for PFT (already have an appt)   - CT PE:  a) No evidence of pulmonary embolism.  b) Diffuse bilateral mosaic ground-glass pattern, which may be secondary to air trapping.  - Pulm O/P for PFT    2.  Episodes of Sleep Attacks  - Endorses chronic daytime sleepiness with sleep attacks  - No cataplexy, sleep paralysis or hypnogogic/hypnopompic hallucinations   - STOP-BANG Score: 4 High Risk for CHRISTO  - Pulm O/P for sleep study    3. H/O A Fib/Flutter  - Reports 2 cardioversion and ablation procedure done  - No palpitations   - EKG: NSR  - c/w home Metoprolol succinate 50 mg daily  - c/w home Aspirin 81 mg daily    4. Type 2 diabetes mellitus with  Right eye affected by mild nonproliferative retinopathy without macular edema  - Home Meds: Semaglutide qweekly, Metformin 100 mg BID, Glipizide 5 mg daily    5. GERD  - c/w Omperazole 40 mg daily    6.  Hyperlipidemia  - c/w Crestor 20 mg daily    7. Vit D deficiency  - Completed VitD 50K course  - f/u Vit D levels  - c/w Vit D3 1000 U daily    8.  Hypertension  - c/w home meds: Lisinopril 20 and HCTZ 25 mg       Patient is now medically stable for discharge. Patient will be sent home with two inhaler prescriptions: Proair and symbicort. Patient also instructed to follow up with an outpatient pulmonary doctor for PFTs. Patient endorsed already having set up an appointment to do this.

## 2024-09-26 NOTE — DISCHARGE NOTE PROVIDER - NSDCFUADDINST_GEN_ALL_CORE_FT
Please follow up with your primary care doctor after discharge, as well as with your pulmonary doctor to complete pulmonary function testing.

## 2024-09-26 NOTE — DISCHARGE NOTE PROVIDER - ATTENDING DISCHARGE PHYSICAL EXAMINATION:
VITALS:   T(C): 36.4 (09-26-24 @ 07:47), Max: 37.1 (09-26-24 @ 06:07)  HR: 74 (09-26-24 @ 07:47) (74 - 83)  BP: 105/71 (09-26-24 @ 07:47) (105/71 - 121/73)  RR: 18 (09-26-24 @ 07:47) (18 - 20)  SpO2: 99% (09-26-24 @ 07:47) (97% - 99%)    GENERAL: NAD, lying in bed comfortably  HEART: Regular rate and rhythm,  LUNGS: Unlabored respirations.  Clear to auscultation bilaterally, no wheezing   ABDOMEN: Soft, nontender, nondistended, +BS  EXTREMITIES: 2+ peripheral pulses bilaterally.  NERVOUS SYSTEM:  A&Ox3, VITALS:   T(C): 36.4 (09-26-24 @ 07:47), Max: 37.1 (09-26-24 @ 06:07)  HR: 74 (09-26-24 @ 07:47) (74 - 83)  BP: 105/71 (09-26-24 @ 07:47) (105/71 - 121/73)  RR: 18 (09-26-24 @ 07:47) (18 - 20)  SpO2: 99% (09-26-24 @ 07:47) (97% - 99%) .     GENERAL: NAD, lying in bed comfortably  HEART: Regular rate and rhythm,  LUNGS: Unlabored respirations.  Clear to auscultation bilaterally, no wheezing   ABDOMEN: Soft, nontender, nondistended, +BS  EXTREMITIES: 2+ peripheral pulses bilaterally.  NERVOUS SYSTEM:  A&Ox3,

## 2024-09-26 NOTE — DISCHARGE NOTE PROVIDER - NSDCCPCAREPLAN_GEN_ALL_CORE_FT
PRINCIPAL DISCHARGE DIAGNOSIS  Diagnosis: Shortness of breath  Assessment and Plan of Treatment: Shortness of breath (dyspnea) means you have trouble breathing and could indicate a medical problem. Causes include lung disease, heart disease, low amount of red blood cells (anemia), poor physical fitness, being overweight, smoking, etc.  If medicines were prescribed, take them as directed for the full length of time directed. Refrain from tobacco products.  SEEK IMMEDIATE MEDICAL CARE IF YOU HAVE ANY OF THE FOLLOWING SYMPTOMS: worsening shortness of breath, chest pain, back pain, abdominal pain, fever, coughing up blood, lightheadedness/dizziness.

## 2024-09-26 NOTE — DISCHARGE NOTE PROVIDER - NSDCMRMEDTOKEN_GEN_ALL_CORE_FT
Albuterol (Eqv-ProAir HFA) 90 mcg/inh inhalation aerosol: 1 puff(s) inhaled every 4 hours as needed for  shortness of breath and/or wheezing  aspirin 81 mg oral delayed release capsule: orally once a day  budesonide-formoterol 80 mcg-4.5 mcg/inh inhalation aerosol: 2 puff(s) inhaled every 12 hours  glipiZIDE 5 mg oral tablet: 1 tab(s) orally once a day  lisinopril-hydrochlorothiazide 20 mg-25 mg oral tablet: 1 tab(s) orally once a day  metFORMIN 1000 mg oral tablet: 1 tab(s) orally 2 times a day  metoprolol succinate 50 mg oral capsule, extended release: 1 cap(s) orally once a day  omeprazole 40 mg oral delayed release capsule: 1 cap(s) orally once a day  Ozempic 4 mg/3 mL (1 mg dose) subcutaneous solution: 1 milligram(s) subcutaneously once a week  rosuvastatin 20 mg oral tablet: 1 tab(s) orally once a day (at bedtime)

## 2024-10-08 DIAGNOSIS — I48.20 CHRONIC ATRIAL FIBRILLATION, UNSPECIFIED: ICD-10-CM

## 2024-10-08 DIAGNOSIS — I48.92 UNSPECIFIED ATRIAL FLUTTER: ICD-10-CM

## 2024-10-08 DIAGNOSIS — E11.3291 TYPE 2 DIABETES MELLITUS WITH MILD NONPROLIFERATIVE DIABETIC RETINOPATHY WITHOUT MACULAR EDEMA, RIGHT EYE: ICD-10-CM

## 2024-10-08 DIAGNOSIS — R55 SYNCOPE AND COLLAPSE: ICD-10-CM

## 2024-10-08 DIAGNOSIS — J42 UNSPECIFIED CHRONIC BRONCHITIS: ICD-10-CM

## 2024-10-08 DIAGNOSIS — E78.5 HYPERLIPIDEMIA, UNSPECIFIED: ICD-10-CM

## 2024-10-08 DIAGNOSIS — Z88.0 ALLERGY STATUS TO PENICILLIN: ICD-10-CM

## 2024-10-08 DIAGNOSIS — E55.9 VITAMIN D DEFICIENCY, UNSPECIFIED: ICD-10-CM

## 2024-10-08 DIAGNOSIS — R91.8 OTHER NONSPECIFIC ABNORMAL FINDING OF LUNG FIELD: ICD-10-CM

## 2024-10-08 DIAGNOSIS — I49.3 VENTRICULAR PREMATURE DEPOLARIZATION: ICD-10-CM

## 2024-10-08 DIAGNOSIS — K21.9 GASTRO-ESOPHAGEAL REFLUX DISEASE WITHOUT ESOPHAGITIS: ICD-10-CM

## 2024-10-08 DIAGNOSIS — Z79.82 LONG TERM (CURRENT) USE OF ASPIRIN: ICD-10-CM

## 2024-10-08 DIAGNOSIS — G47.19 OTHER HYPERSOMNIA: ICD-10-CM

## 2024-10-08 DIAGNOSIS — I10 ESSENTIAL (PRIMARY) HYPERTENSION: ICD-10-CM

## 2024-10-08 DIAGNOSIS — Z79.84 LONG TERM (CURRENT) USE OF ORAL HYPOGLYCEMIC DRUGS: ICD-10-CM

## 2024-10-28 ENCOUNTER — APPOINTMENT (OUTPATIENT)
Dept: OBGYN | Facility: CLINIC | Age: 74
End: 2024-10-28

## 2024-11-26 ENCOUNTER — EMERGENCY (EMERGENCY)
Facility: HOSPITAL | Age: 74
LOS: 0 days | Discharge: HOME | End: 2024-11-26
Attending: EMERGENCY MEDICINE
Payer: MEDICARE

## 2024-11-26 VITALS
RESPIRATION RATE: 18 BRPM | OXYGEN SATURATION: 98 % | DIASTOLIC BLOOD PRESSURE: 79 MMHG | WEIGHT: 154.98 LBS | TEMPERATURE: 98 F | SYSTOLIC BLOOD PRESSURE: 122 MMHG | HEIGHT: 63 IN | HEART RATE: 75 BPM

## 2024-11-26 PROCEDURE — 73562 X-RAY EXAM OF KNEE 3: CPT | Mod: LT

## 2024-11-26 PROCEDURE — 73060 X-RAY EXAM OF HUMERUS: CPT | Mod: LT

## 2024-11-26 PROCEDURE — 99285 EMERGENCY DEPT VISIT HI MDM: CPT

## 2024-11-26 PROCEDURE — 73610 X-RAY EXAM OF ANKLE: CPT | Mod: LT

## 2024-11-26 PROCEDURE — 70450 CT HEAD/BRAIN W/O DYE: CPT | Mod: MC

## 2024-11-26 PROCEDURE — 71101 X-RAY EXAM UNILAT RIBS/CHEST: CPT | Mod: LT

## 2024-11-26 PROCEDURE — 73590 X-RAY EXAM OF LOWER LEG: CPT | Mod: LT

## 2024-11-26 PROCEDURE — 72125 CT NECK SPINE W/O DYE: CPT | Mod: 26,MC

## 2024-11-26 PROCEDURE — 90471 IMMUNIZATION ADMIN: CPT

## 2024-11-26 PROCEDURE — 73060 X-RAY EXAM OF HUMERUS: CPT | Mod: 26,LT

## 2024-11-26 PROCEDURE — 99284 EMERGENCY DEPT VISIT MOD MDM: CPT | Mod: 25

## 2024-11-26 PROCEDURE — 70450 CT HEAD/BRAIN W/O DYE: CPT | Mod: 26,MC

## 2024-11-26 PROCEDURE — 72125 CT NECK SPINE W/O DYE: CPT | Mod: MC

## 2024-11-26 PROCEDURE — 73590 X-RAY EXAM OF LOWER LEG: CPT | Mod: 26,LT

## 2024-11-26 PROCEDURE — 90715 TDAP VACCINE 7 YRS/> IM: CPT

## 2024-11-26 PROCEDURE — 71101 X-RAY EXAM UNILAT RIBS/CHEST: CPT | Mod: 26,LT

## 2024-11-26 PROCEDURE — 73562 X-RAY EXAM OF KNEE 3: CPT | Mod: 26,LT

## 2024-11-26 PROCEDURE — 73080 X-RAY EXAM OF ELBOW: CPT | Mod: LT

## 2024-11-26 PROCEDURE — 73080 X-RAY EXAM OF ELBOW: CPT | Mod: 26,LT

## 2024-11-26 PROCEDURE — 73610 X-RAY EXAM OF ANKLE: CPT | Mod: 26,LT

## 2024-11-26 RX ORDER — ACETAMINOPHEN 500 MG
975 TABLET ORAL ONCE
Refills: 0 | Status: COMPLETED | OUTPATIENT
Start: 2024-11-26 | End: 2024-11-26

## 2024-11-26 RX ORDER — CLOSTRIDIUM TETANI TOXOID ANTIGEN (FORMALDEHYDE INACTIVATED), CORYNEBACTERIUM DIPHTHERIAE TOXOID ANTIGEN (FORMALDEHYDE INACTIVATED), BORDETELLA PERTUSSIS TOXOID ANTIGEN (GLUTARALDEHYDE INACTIVATED), BORDETELLA PERTUSSIS FILAMENTOUS HEMAGGLUTININ ANTIGEN (FORMALDEHYDE INACTIVATED), BORDETELLA PERTUSSIS PERTACTIN ANTIGEN, AND BORDETELLA PERTUSSIS FIMBRIAE 2/3 ANTIGEN 5; 2; 2.5; 5; 3; 5 [LF]/.5ML; [LF]/.5ML; UG/.5ML; UG/.5ML; UG/.5ML; UG/.5ML
0.5 INJECTION, SUSPENSION INTRAMUSCULAR ONCE
Refills: 0 | Status: COMPLETED | OUTPATIENT
Start: 2024-11-26 | End: 2024-11-26

## 2024-11-26 RX ADMIN — CLOSTRIDIUM TETANI TOXOID ANTIGEN (FORMALDEHYDE INACTIVATED), CORYNEBACTERIUM DIPHTHERIAE TOXOID ANTIGEN (FORMALDEHYDE INACTIVATED), BORDETELLA PERTUSSIS TOXOID ANTIGEN (GLUTARALDEHYDE INACTIVATED), BORDETELLA PERTUSSIS FILAMENTOUS HEMAGGLUTININ ANTIGEN (FORMALDEHYDE INACTIVATED), BORDETELLA PERTUSSIS PERTACTIN ANTIGEN, AND BORDETELLA PERTUSSIS FIMBRIAE 2/3 ANTIGEN 0.5 MILLILITER(S): 5; 2; 2.5; 5; 3; 5 INJECTION, SUSPENSION INTRAMUSCULAR at 10:13

## 2024-11-26 RX ADMIN — Medication 975 MILLIGRAM(S): at 10:12

## 2024-11-26 NOTE — ED ADULT NURSE NOTE - CHIEF COMPLAINT QUOTE
pt BIBA s/p witnessed  trip and fall down 3 steps. - LOC denies ac use. hematoma and abrasion noted to left forehead. c-collar cleared in triage.

## 2024-11-26 NOTE — ED PROVIDER NOTE - NSFOLLOWUPINSTRUCTIONS_ED_ALL_ED_FT
Please follow up with your primary care physician within 24-72 hours and return immediately if symptoms worsen.    Fall Prevention in the Home    Falls can cause injuries. They can happen to people of all ages. There are many things you can do to make your home safe and to help prevent falls.     WHAT CAN I DO ON THE OUTSIDE OF MY HOME?  Regularly fix the edges of walkways and driveways and fix any cracks.  Remove anything that might make you trip as you walk through a door, such as a raised step or threshold.  Trim any bushes or trees on the path to your home.  Use bright outdoor lighting.  Clear any walking paths of anything that might make someone trip, such as rocks or tools.  Regularly check to see if handrails are loose or broken. Make sure that both sides of any steps have handrails.  Any raised decks and porches should have guardrails on the edges.  Have any leaves, snow, or ice cleared regularly.  Use sand or salt on walking paths during winter.  Clean up any spills in your garage right away. This includes oil or grease spills.    WHAT CAN I DO IN THE BATHROOM?  Use night lights.  Install grab bars by the toilet and in the tub and shower. Do not use towel bars as grab bars.  Use non-skid mats or decals in the tub or shower.  If you need to sit down in the shower, use a plastic, non-slip stool.  Keep the floor dry. Clean up any water that spills on the floor as soon as it happens.  Remove soap buildup in the tub or shower regularly.  Attach bath mats securely with double-sided non-slip rug tape.  Do not have throw rugs and other things on the floor that can make you trip.    WHAT CAN I DO IN THE BEDROOM?  Use night lights.  Make sure that you have a light by your bed that is easy to reach.  Do not use any sheets or blankets that are too big for your bed. They should not hang down onto the floor.  Have a firm chair that has side arms. You can use this for support while you get dressed.  Do not have throw rugs and other things on the floor that can make you trip.    WHAT CAN I DO IN THE KITCHEN?  Clean up any spills right away.  Avoid walking on wet floors.  Keep items that you use a lot in easy-to-reach places.  If you need to reach something above you, use a strong step stool that has a grab bar.  Keep electrical cords out of the way.  Do not use floor polish or wax that makes floors slippery. If you must use wax, use non-skid floor wax.  Do not have throw rugs and other things on the floor that can make you trip.    WHAT CAN I DO WITH MY STAIRS?  Do not leave any items on the stairs.  Make sure that there are handrails on both sides of the stairs and use them. Fix handrails that are broken or loose. Make sure that handrails are as long as the stairways.  Check any carpeting to make sure that it is firmly attached to the stairs. Fix any carpet that is loose or worn.  Avoid having throw rugs at the top or bottom of the stairs. If you do have throw rugs, attach them to the floor with carpet tape.  Make sure that you have a light switch at the top of the stairs and the bottom of the stairs. If you do not have them, ask someone to add them for you.    WHAT ELSE CAN I DO TO HELP PREVENT FALLS?  Wear shoes that:  Do not have high heels.  Have rubber bottoms.  Are comfortable and fit you well.  Are closed at the toe. Do not wear sandals.  If you use a stepladder:  Make sure that it is fully opened. Do not climb a closed stepladder.  Make sure that both sides of the stepladder are locked into place.  Ask someone to hold it for you, if possible.  Clearly emily and make sure that you can see:  Any grab bars or handrails.  First and last steps.  Where the edge of each step is.  Use tools that help you move around (mobility aids) if they are needed. These include:  Canes.  Walkers.  Scooters.  Crutches.  Turn on the lights when you go into a dark area. Replace any light bulbs as soon as they burn out.  Set up your furniture so you have a clear path. Avoid moving your furniture around.  If any of your floors are uneven, fix them.  If there are any pets around you, be aware of where they are.  Review your medicines with your doctor. Some medicines can make you feel dizzy. This can increase your chance of falling.    Ask your doctor what other things that you can do to help prevent falls.    ADDITIONAL NOTES AND INSTRUCTIONS    Please follow up with your Primary MD in 24-48 hr.  Seek immediate medical care for any new/worsening signs or symptoms.   Hematoma  A hematoma is a collection of blood under the skin, in an organ, in a body space, in a joint space, or in other tissue. The blood can thicken (clot) to form a lump that you can see and feel. The lump is often firm and may become sore and tender. Most hematomas get better in a few days to weeks. However, some hematomas may be serious and require medical care. Hematomas can range from very small to very large.    What are the causes?  This condition is caused by:    A blunt or penetrating injury.  A leakage from a blood vessel under the skin. This leak happens on its own (is spontaneous) and is more likely to occur in older people, especially those who take blood thinners.  Some medical procedures including surgeries, such as oral surgery, face lifts, and surgeries that involve the joints.  Some medical conditions that cause bleeding or bruising problems. There may be multiple hematomas that appear in different areas of the body.    What are the signs or symptoms?  Symptoms of this condition can depend on where the hematoma is located. Common symptoms of a hematoma under the skin include:    A firm lump on the body.  Pain and tenderness in the area.  Bruising. Blue, dark blue, purple-red, or yellowish skin (discoloration) may appear at the site of the hematoma if the hematoma is close to the surface of the skin.    For hematomas in deeper tissues or body spaces, symptoms may be less obvious. A collection of blood in the stomach (intra-abdominal hematoma) may cause pain in the abdomen, weakness, fainting, and shortness of breath. A collection of blood in the head (intracranial hematoma) may cause a headache or symptoms such as weakness, trouble speaking or understanding, or a change in consciousness.    How is this diagnosed?  This condition is diagnosed based on:    Your medical history.  A physical exam.  Imaging tests, such as an ultrasonogram or CT scan. These may be needed if your health care provider suspects a hematoma in deeper tissues or body spaces.  Blood tests. These may be needed if your health care provider believes that the hematoma is caused by a medical condition.    How is this treated?  This condition usually does not need treatment because many hematomas go away on their own over time. However, large hematomas, or those that may affect vital organs, may need surgical drainage or monitoring. If the hematoma is caused by a medical condition, medicines may be prescribed.    Follow these instructions at home:  Managing pain, stiffness, and swelling     If directed, apply ice to the affected area:    Put ice in a plastic bag.  Place a towel between your skin and the bag.  Leave the ice on for 20 minutes, 2–3 times a day for the first couple of days.    After applying ice for a couple of days, your health care provider may recommend that you apply warm compresses to the affected area instead. Do this as told by your health care provider. Remove the heat if your skin turns bright red. This is especially important if you are unable to feel pain, heat, or cold. You may have a greater risk of getting burned  Raise (elevate) the affected area above the level of your heart while you are sitting or lying down.  Wrap the affected area with an elastic bandage, if told by your health care provider. The bandage applies pressure (compression) to the area, which may help to reduce swelling and help the hematoma heal. Make sure the bandage is not wrapped too tight.  If your hematoma is on a leg or foot (lower extremity) and is painful, your health care provider may recommend crutches. Use them as told by your health care provider.  General instructions     Take over-the-counter and prescription medicines only as told by your health care provider.  Keep all follow-up visits as told by your health care provider. This is important.  Contact a health care provider if:  You have a fever.  The swelling or discoloration gets worse.  You develop more hematomas.  Get help right away if:  Your pain is worse or your pain is not controlled with medicine.  Your skin over the hematoma breaks or starts bleeding.  Your hematoma is in your chest or abdomen and you have weakness, shortness of breath, or a change in consciousness.  You have a hematoma on your scalp caused by a fall or injury and you have a headache that gets worse, trouble speaking or understanding, weakness, or a change in alertness or consciousness.  Summary  A hematoma is a collection of blood under the skin, in an organ, in a body space, in a joint space, or in other tissue.  This condition usually does not need treatment because many hematomas go away on their own over time.  Large hematomas, or those that may affect vital organs, may need surgical drainage or monitoring. If the hematoma is caused by a medical condition, medicines may be prescribed.

## 2024-11-26 NOTE — ED PROVIDER NOTE - PATIENT PORTAL LINK FT
You can access the FollowMyHealth Patient Portal offered by [facility] by registering at the following website: [email or web address]. By joining [facility]’s FollowMyHealth portal, you will also be able to view your health information using other applications (apps) compatible with our system.

## 2024-11-26 NOTE — ED ADULT NURSE NOTE - NSFALLHARMRISKINTERV_ED_ALL_ED
Assistance OOB with selected safe patient handling equipment if applicable/Communicate risk of Fall with Harm to all staff, patient, and family/Provide visual cue: red socks, yellow wristband, yellow gown, etc/Reinforce activity limits and safety measures with patient and family/Bed in lowest position, wheels locked, appropriate side rails in place/Call bell, personal items and telephone in reach/Instruct patient to call for assistance before getting out of bed/chair/stretcher/Non-slip footwear applied when patient is off stretcher/[place] to call system/Physically safe environment - no spills, clutter or unnecessary equipment/Purposeful Proactive Rounding/Room/bathroom lighting operational, light cord in reach

## 2024-11-26 NOTE — ED ADULT TRIAGE NOTE - CHIEF COMPLAINT QUOTE
pt BIBA s/p witnessed  trip and fall down 3 steps. - LOC denies ac use. hematoma and abrasion noted to left forehead. pt BIBA s/p witnessed  trip and fall down 3 steps. - LOC denies ac use. hematoma and abrasion noted to left forehead. c-collar cleared in triage.

## 2024-11-26 NOTE — ED PROVIDER NOTE - OBJECTIVE STATEMENT
74-year-old female with a past medical history of hypertension, hyperlipidemia, diabetes, GERD presents after mechanical fall.  Patient states Fell on her left side with head trauma but no LOC.  Patient states to have pain to her right arm and right leg.  Patient unsure of last tetanus shot. pt denies any other symptoms including fevers, chill, headache, recent illness/travel, cough, abdominal pain, chest pain, or SOB.

## 2024-11-26 NOTE — ED PROVIDER NOTE - PHYSICAL EXAMINATION
Gen: NAD, AOx3  Head: NCAT  HEENT: PERRL, oral mucosa moist, normal conjunctiva, oropharynx clear without exudate or erythema  Lung: CTAB, no respiratory distress, no wheezing, rales, rhonchi  CV: normal s1/s2, rrr, Normal perfusion, pulses 2+ throughout  Abd: soft, NTND, no CVA tenderness  Genitourinary: no pelvic tenderness  MSK: No edema, no visible deformities, full range of motion in all 4 extremities, no spinal tenderness, TTP L humerus/elbow, TTP L lateral knee/tibia/ankle   Neuro: CN II-XII grossly intact, No focal neurologic deficits, no meningeal signs, no nystagmus/pronator drift, coordination/sensation intact, strength 5/5 BUE/BLE  Skin: No rash, hematoma to L forehead   Psych: normal affect

## 2024-11-26 NOTE — ED PROVIDER NOTE - CLINICAL SUMMARY MEDICAL DECISION MAKING FREE TEXT BOX
No distress.  VSS.  S/P Mechanical fall at home.  No syncope or LOC.  All imaging in the ED negative for acute pathology.  Supportive care.  DC home.  Strict return instructions discussed with family at bedside.